# Patient Record
Sex: MALE | Race: OTHER
[De-identification: names, ages, dates, MRNs, and addresses within clinical notes are randomized per-mention and may not be internally consistent; named-entity substitution may affect disease eponyms.]

---

## 2018-01-08 ENCOUNTER — HOSPITAL ENCOUNTER (OUTPATIENT)
Dept: HOSPITAL 63 - ER | Age: 38
Setting detail: OBSERVATION
Discharge: HOME | End: 2018-01-08
Attending: FAMILY MEDICINE | Admitting: FAMILY MEDICINE
Payer: OTHER GOVERNMENT

## 2018-01-08 VITALS — SYSTOLIC BLOOD PRESSURE: 118 MMHG | DIASTOLIC BLOOD PRESSURE: 70 MMHG

## 2018-01-08 VITALS — WEIGHT: 217 LBS | HEIGHT: 70 IN | BODY MASS INDEX: 31.07 KG/M2

## 2018-01-08 VITALS — SYSTOLIC BLOOD PRESSURE: 115 MMHG | DIASTOLIC BLOOD PRESSURE: 74 MMHG

## 2018-01-08 DIAGNOSIS — R07.89: Primary | ICD-10-CM

## 2018-01-08 DIAGNOSIS — Z82.49: ICD-10-CM

## 2018-01-08 DIAGNOSIS — M62.82: ICD-10-CM

## 2018-01-08 DIAGNOSIS — E86.0: ICD-10-CM

## 2018-01-08 LAB
ALBUMIN SERPL-MCNC: 4.3 G/DL (ref 3.4–5)
ALBUMIN/GLOB SERPL: 1 {RATIO} (ref 1–1.7)
ALP SERPL-CCNC: 37 U/L (ref 46–116)
ALT SERPL-CCNC: 56 U/L (ref 16–63)
ANION GAP SERPL CALC-SCNC: 11 MMOL/L (ref 6–14)
AST SERPL-CCNC: 52 U/L (ref 15–37)
BASOPHILS # BLD AUTO: 0 X10^3/UL (ref 0–0.2)
BASOPHILS NFR BLD: 1 % (ref 0–3)
BILIRUB SERPL-MCNC: 0.8 MG/DL (ref 0.2–1)
BUN/CREAT SERPL: 30 (ref 6–20)
CA-I SERPL ISE-MCNC: 30 MG/DL (ref 8–26)
CALCIUM SERPL-MCNC: 9 MG/DL (ref 8.5–10.1)
CHLORIDE SERPL-SCNC: 101 MMOL/L (ref 98–107)
CK SERPL-CCNC: 925 U/L (ref 39–308)
CO2 SERPL-SCNC: 27 MMOL/L (ref 21–32)
CREAT SERPL-MCNC: 1 MG/DL (ref 0.7–1.3)
EOSINOPHIL NFR BLD: 0.1 X10^3/UL (ref 0–0.7)
EOSINOPHIL NFR BLD: 1 % (ref 0–3)
ERYTHROCYTE [DISTWIDTH] IN BLOOD BY AUTOMATED COUNT: 12.6 % (ref 11.5–14.5)
GFR SERPLBLD BASED ON 1.73 SQ M-ARVRAT: 83.2 ML/MIN
GLOBULIN SER-MCNC: 4.1 G/DL (ref 2.2–3.8)
GLUCOSE SERPL-MCNC: 91 MG/DL (ref 70–99)
HCT VFR BLD CALC: 44.4 % (ref 39–53)
HGB BLD-MCNC: 15.3 G/DL (ref 13–17.5)
LYMPHOCYTES # BLD: 1.6 X10^3/UL (ref 1–4.8)
LYMPHOCYTES NFR BLD AUTO: 30 % (ref 24–48)
MAGNESIUM SERPL-MCNC: 2 MG/DL (ref 1.8–2.4)
MCH RBC QN AUTO: 31 PG (ref 25–35)
MCHC RBC AUTO-ENTMCNC: 34 G/DL (ref 31–37)
MCV RBC AUTO: 90 FL (ref 79–100)
MONO #: 0.6 X10^3/UL (ref 0–1.1)
MONOCYTES NFR BLD: 12 % (ref 0–9)
NEUT #: 3 X10^3UL (ref 1.8–7.7)
NEUTROPHILS NFR BLD AUTO: 56 % (ref 31–73)
PLATELET # BLD AUTO: 218 X10^3/UL (ref 140–400)
POTASSIUM SERPL-SCNC: 4 MMOL/L (ref 3.5–5.1)
PROT SERPL-MCNC: 8.4 G/DL (ref 6.4–8.2)
RBC # BLD AUTO: 4.96 X10^6/UL (ref 4.3–5.7)
SODIUM SERPL-SCNC: 139 MMOL/L (ref 136–145)
WBC # BLD AUTO: 5.4 X10^3/UL (ref 4–11)

## 2018-01-08 PROCEDURE — 82553 CREATINE MB FRACTION: CPT

## 2018-01-08 PROCEDURE — 99285 EMERGENCY DEPT VISIT HI MDM: CPT

## 2018-01-08 PROCEDURE — 71046 X-RAY EXAM CHEST 2 VIEWS: CPT

## 2018-01-08 PROCEDURE — 96360 HYDRATION IV INFUSION INIT: CPT

## 2018-01-08 PROCEDURE — 85025 COMPLETE CBC W/AUTO DIFF WBC: CPT

## 2018-01-08 PROCEDURE — 85379 FIBRIN DEGRADATION QUANT: CPT

## 2018-01-08 PROCEDURE — 93005 ELECTROCARDIOGRAM TRACING: CPT

## 2018-01-08 PROCEDURE — G0378 HOSPITAL OBSERVATION PER HR: HCPCS

## 2018-01-08 PROCEDURE — G0379 DIRECT REFER HOSPITAL OBSERV: HCPCS

## 2018-01-08 PROCEDURE — 80053 COMPREHEN METABOLIC PANEL: CPT

## 2018-01-08 PROCEDURE — 83735 ASSAY OF MAGNESIUM: CPT

## 2018-01-08 PROCEDURE — 84484 ASSAY OF TROPONIN QUANT: CPT

## 2018-01-08 PROCEDURE — 36415 COLL VENOUS BLD VENIPUNCTURE: CPT

## 2018-01-08 NOTE — NUR
ADMIT: Pt admitted to unit from ER per Dr. Killian, transported via EMS and gurney, IV fluids 
continued, pt dx of chest pain and dehydration.

## 2018-01-08 NOTE — CONS
DATE OF CONSULTATION:  01/08/2018



REASON FOR CONSULTATION:  Chest pain.



HISTORY OF PRESENT ILLNESS:  The patient is a 37-year-old male who presents to

the hospital in the setting of chest discomfort.  He reports that he has been in

his usual state of health and approximately 6 this morning had an episode of

sharp pain that lasted about 3 seconds without any recurrent episodes.  Did not

have any significant chest pain otherwise.  No syncope or palpitations.  He had

some mild lightheadedness.  He has had 2 other episodes in the past of similar

nature.  At baseline, the patient is a very functional person.  He is able to

run 4 miles without any significant limitations.  Most recently, he has been

under strict extreme diet of essentially fasting for approximately 18 hours and

drinking mostly protein shakes.



PAST MEDICAL HISTORY:  No significant past medical history.



SOCIAL HISTORY:  He is .  He does not smoke.  He denies any alcohol,

tobacco, or illicit drug use.  He is in the officer in the  school right

now.



FAMILY HISTORY:  Notable for coronary artery disease in his father.  No

significant early atherosclerotic coronary artery disease.



ALLERGIES:  No known drug allergies.



CURRENT CARDIOVASCULAR MEDICATIONS:  None.



REVIEW OF SYSTEMS:  Negative for 10 out of 14 systems reviewed, unless otherwise

mentioned above in HPI.



PHYSICAL EXAMINATION:

VITAL SIGNS:  Afebrile, heart rate 66, respirations 20, blood pressure 118/70,

and 98% on room air.

GENERAL:  He is alert and oriented, in no acute distress.

HEAD AND NECK:  Unremarkable.

CARDIAC:  Regular rate and rhythm without any murmurs, rubs, or gallops.  Neck

veins are flat.

LUNGS:  Clear to auscultation bilaterally.

ABDOMEN:  Soft, nontender, nondistended.

EXTREMITIES:  No clubbing, cyanosis, or edema.  2+ radial and dorsalis pedis

pulses.

NEUROLOGIC:  No focal deficits.

MUSCULOSKELETAL:  No trauma.



DIAGNOSTIC STUDIES:  Initial cardiac enzymes negative x 1.



D-dimer is negative x 1.  Creatinine 1.0.



CK and CK-MB are minimally elevated, likely in the setting of dehydration and

significant exercise since 01/01/2018.



Chest x-ray is unremarkable.



EKG is unremarkable.



IMPRESSION:  Noncardiac chest pain:  Given the fact that he is able to run 4

miles and he has a negative D-dimer and negative troponin at this time, there is

no obvious acute pathology of concern.



RECOMMENDATIONS:  The patient was advised to refrain from his extreme dietary

restrictions that he has been putting himself on.  Supportive care from a

cardiac standpoint.  Okay to discharge if the next troponin is negative.



Thank you for this consultation.





______________________________

BRIANA ANGELO MD



DR:  MO/anthony  JOB#:  [id number] / [id number]

DD:  01/08/2018 13:23  DT:  01/08/2018 13:46

PATITO

## 2018-01-08 NOTE — SSS
ADMIT DATE:  01/08/2018



Stay was greater than 8 hours and less than 24.  Care was given by Dr. Killian.



CONSULTANT:  Dr. Torres.



DISCHARGE DIAGNOSES:

1. Chest pain, myocardial infarction ruled out, due to dehydration.

2. Rhabdomyolysis secondary to extreme calorie deficient diet.

3. Dehydration.



HOSPITAL COURSE:  This is a 37-year-old active  who attends [employer]. 

His weight appointment was coming up this week, and on this 01/02/2018, he

started a very restrictive diet, almost all protein, fasting for 18 hours, not

drinking any fluids and then started an aggressive exercise and running program.

 He states he has lost 22 pounds since 01/01/2018.  He presented this morning

with fleeting chest pain, central, also dizzy.  He had drunk a lot of black

coffee.  No radiation, no diaphoresis, just dizzy.



PAST MEDICAL HISTORY:  Negative.



PAST SURGICAL HISTORY:  Hernia repair.



FAMILY HISTORY:  Father did die at 56 of some type of cardiac event.



HABITS:  Does not smoke, quit drinking 4 years ago.  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



REVIEW OF SYSTEMS:  As per HPI.



OBJECTIVES:

VITAL SIGNS:  Blood pressure 118/70, pulse 66, temperature 98, respirations 20,

pulse ox 98% on room air.  Height 70 inches, weight 217 pounds.

GENERAL:  A 37-year-old in no acute distress.

HEENT:  Her eyes were clear.  Tongue was moist.  Throat was clear in the

posterior pharynx.

NECK:  Supple, without adenopathy.

LUNGS:  Clear in all fields.

CARDIOVASCULAR:  Regular rhythm and rate without tachycardia.

ABDOMEN:  Soft.  Bowel sounds are positive.  Nontender.

EXTREMITIES:  Without edema.

MUSCULOSKELETAL:  Slightly sore muscles in his lower leg, particularly on the

shin area.



LABORATORY DATA:  CPK is 925.  Troponins negative x 2.  BUN was 30, creatinine

1.0.  D-dimer negative.



ASSESSMENT:

1.  Mild rhabdomyolysis.

2.  Chest pain, myocardial infarction ruled out.

3.  Severe rapid weight loss secondary to severe calorie deficient diet.



PLAN:  The patient received 2 units of IV fluids.  He was seen by Dr. Torres.

 MI has been ruled out.  He was strongly cautioned not to continue with this

program of fasting and extreme exercise.  Mainly walk for the next week or so. 

Note given for work for tomorrow.  He is to drink fluids liberally.





______________________________

BREA KILLIAN DO



DR:  ELIZABETH/anthony  JOB#:  [id number] / [id number]

DD:  01/08/2018 15:48  DT:  01/08/2018 20:19

## 2018-01-08 NOTE — RAD
PA and lateral chest radiographs 1/8/2018.



Clinical History: Chest pain. 



PA and lateral digital radiographs of the chest were obtained. No previous

studies are available for comparison. The cardiac and mediastinal silhouettes

are within normal limits in size and configuration. No pulmonary infiltrate is

seen. No pleural effusion or pneumothorax is noted. The osseous structures are

grossly intact.



Impression: No radiographic evidence of active cardiopulmonary disease.

## 2018-01-08 NOTE — PHYS DOC
Past History


Past Medical History:  Seizure


Past Surgical History:  Other


Alcohol Use:  Sober


Additional Alcohol Information:  


sober 4 years


Drug Use:  None





Adult General


Chief Complaint


Chief Complaint:  CHEST PAIN





[facility]


HPI





37-year-old male patient complaining of 2 episodes of non-exertional substernal 

chest pain since 6:15 this morning a sharp pain and associated with shortness 

of breath and dizziness and near syncope that last about couple seconds and 

repeated twice. Patient denies palpitation and nausea and focal neurodeficit. 

Patient states he had a couple episodes of remote chest pain without 

evaluation. Patient rated his pain  about 7/10 and denies any chest pain at 

that time. Patient does not have any medical problems and denies smoking and 

using drugs or drinking alcohol and recent immobilization. Patient has positive 

family history  for coronary artery disease.





Review of Systems


Review of Systems





Constitutional: Denies fever or chills []


Eyes: Denies change in visual acuity, redness, or eye pain []


HENT: Denies nasal congestion or sore throat []


Respiratory: Denies cough, reports shortness of breath []


Cardiovascular: No additional information not addressed in HPI []


GI: Denies abdominal pain, nausea, vomiting, bloody stools or diarrhea []


: Denies dysuria or hematuria []


Musculoskeletal: Denies back pain or joint pain []


Integument: Denies rash or skin lesions []


Neurologic: Denies headache, focal weakness or sensory changes , reports 

dizziness and near syncope[]


Endocrine: Denies polyuria or polydipsia []





All other systems were reviewed and found to be within normal limits, except as 

documented in this note.





Current Medications


Current Medications





Current Medications








 Medications


  (Trade)  Dose


 Ordered  Sig/[facility]  Start Time


 Stop Time Status Last Admin


Dose Admin


 


 Aspirin


  ([facility]


 Aspirin)  324 mg  1X  ONCE  1/8/18 08:00


 1/8/18 08:05 DC 1/8/18 07:54


324 MG











Allergies


Allergies





Allergies








Coded Allergies Type Severity Reaction Last Updated Verified


 


  No Known Drug Allergies    1/8/18 No











Physical Exam


Physical Exam





Constitutional: Well developed, well nourished, no acute distress, non-toxic 

appearance, anxious. []


HENT: Normocephalic, atraumatic, bilateral external ears normal, oropharynx 

moist, no oral exudates, nose normal. []


Eyes: PERRLA, EOMI, conjunctiva normal, no discharge. [] 


Neck: Normal range of motion, no tenderness, supple, no stridor. [] 


Cardiovascular:Heart rate regular rhythm, no murmur []


Lungs & Thorax:  Bilateral breath sounds clear to auscultation []


Abdomen: Bowel sounds normal, soft, no tenderness, no masses, no pulsatile 

masses. [] 


Skin: Warm, dry, no erythema, no rash. [] 


Back: No tenderness, no CVA tenderness. [] 


Extremities: No tenderness, no cyanosis, no clubbing, ROM intact, no edema. [] 


Neurologic: Alert and oriented X 3, normal motor function, normal sensory 

function, no focal deficits noted. []


Psychologic: Affect normal, judgement normal, mood normal. []





Current Patient Data


Vital Signs





 Vital Signs








  Date Time  Temp Pulse Resp B/P (MAP) Pulse Ox O2 Delivery O2 Flow Rate FiO2


 


1/8/18 07:20 97.6 68 18  99 Room Air  








Lab Results





 Laboratory Tests








Test


  1/8/18


07:35


 


White Blood Count


  5.4 x10^3/uL


(4.0-11.0)


 


Red Blood Count


  4.96 x10^6/uL


(4.30-5.70)


 


Hemoglobin


  15.3 g/dL


(13.0-17.5)


 


Hematocrit


  44.4 %


(39.0-53.0)


 


Mean Corpuscular Volume


  90 fL ()


 


 


Mean Corpuscular Hemoglobin 31 pg (25-35)  


 


Mean Corpuscular Hemoglobin


Concent 34 g/dL


(31-37)


 


Red Cell Distribution Width


  12.6 %


(11.5-14.5)


 


Platelet Count


  218 x10^3/uL


(140-400)


 


Neutrophils (%) (Auto) 56 % (31-73)  


 


Lymphocytes (%) (Auto) 30 % (24-48)  


 


Monocytes (%) (Auto) 12 % (0-9)  H


 


Eosinophils (%) (Auto) 1 % (0-3)  


 


Basophils (%) (Auto) 1 % (0-3)  


 


Neutrophils # (Auto)


  3.0 x10^3uL


(1.8-7.7)


 


Lymphocytes # (Auto)


  1.6 x10^3/uL


(1.0-4.8)


 


Monocytes # (Auto)


  0.6 x10^3/uL


(0.0-1.1)


 


Eosinophils # (Auto)


  0.1 x10^3/uL


(0.0-0.7)


 


Basophils # (Auto)


  0.0 x10^3/uL


(0.0-0.2)


 


Troponin I Quantitative


  < 0.017 ng/mL


(0-0.055)











EKG


EKG


[EKG interpreted by me. EKG at 0 728 showed normal sinus rhythm at rate of 73, 

right fourth axis, poor R-wave progress in anteroseptal leads]





Radiology/Procedures


Radiology/Procedures


[Chest x-ray was not reported acute abnormality





Course & Med Decision Making


Course & Med Decision Making


Pertinent Labs and Imaging studies reviewed. (See chart for details)


Evaluation of patient in ER showed 37-year-old male patient with 2 episodes of 

chest pain for few seconds this morning. Patient had unremarkable physical 

exam. Labs showed elevation of the uterine and CPK and CK-MB. Patient stated he 

lost about 5 pounds for the last 1 week and increased his level of activity and 

decreased his liquid intake. Plan to start IV fluid and admit patient to 

hospitalist with diagnosis of acute chest pain and dehydration and 

rhabdomyolysis. Dr. Killian consulted and accepted admission at 0 827.





Dragon Disclaimer


Dragon Disclaimer


This electronic medical record was generated, in whole or in part, using a 

voice recognition dictation system.





Departure


Departure:


Impression:  


 Primary Impression:  


 Acute chest pain


 Additional Impressions:  


 Dehydration


 Rhabdomyolysis


Disposition:  09 ADMITTED AS INPATIENT (At 0827)


Admitting Physician:  Brea Killian


Condition:  IMPROVED


Referrals:  


BREA OBRIEN MD (PCP)





Problem Qualifiers











ANAYELI BOATENG MD Jan 8, 2018 08:20

## 2018-01-08 NOTE — NUR
Pt DC 1500 per Dr. Torres and Dr. Killian, pt ambulated to exit with wife, pt given Dr. mohr to skip one day of  schooling for proper recovery, pt encouraged to drink 
fluids.

## 2018-01-08 NOTE — EKG
[facility] [address], [place] [address]

Test Date:    2018               Test Time:    07:28:40

Pat Name:     NATHANIEL BEE             Department:   

Patient ID:   [id number]           Room:          

Gender:       M                        Technician:   ERVIN

:          1980               Requested By: ANAYELI BOATENG

Order Number: [id number]            Reading MD:   Naresh Torres MD

                                 Measurements

Intervals                              Axis          

Rate:         73                       P:            27

VA:           142                      QRS:          102

QRSD:         96                       T:            -2

QT:           380                                    

QTc:          422                                    

                           Interpretive Statements

SR

LIMB LEAD REVERSAL





Electronically Signed On 2018 13:24:12 CST by Naresh Torres MD